# Patient Record
Sex: MALE | Race: OTHER | NOT HISPANIC OR LATINO | Employment: FULL TIME | URBAN - METROPOLITAN AREA
[De-identification: names, ages, dates, MRNs, and addresses within clinical notes are randomized per-mention and may not be internally consistent; named-entity substitution may affect disease eponyms.]

---

## 2020-07-10 ENCOUNTER — HOSPITAL ENCOUNTER (EMERGENCY)
Facility: MEDICAL CENTER | Age: 60
End: 2020-07-10
Attending: EMERGENCY MEDICINE
Payer: COMMERCIAL

## 2020-07-10 VITALS
HEART RATE: 70 BPM | TEMPERATURE: 98.3 F | DIASTOLIC BLOOD PRESSURE: 88 MMHG | WEIGHT: 149.03 LBS | OXYGEN SATURATION: 98 % | SYSTOLIC BLOOD PRESSURE: 139 MMHG | HEIGHT: 65 IN | BODY MASS INDEX: 24.83 KG/M2 | RESPIRATION RATE: 18 BRPM

## 2020-07-10 DIAGNOSIS — R52 BODY ACHES: ICD-10-CM

## 2020-07-10 DIAGNOSIS — R68.89 FLU-LIKE SYMPTOMS: ICD-10-CM

## 2020-07-10 DIAGNOSIS — Z20.822 SUSPECTED COVID-19 VIRUS INFECTION: ICD-10-CM

## 2020-07-10 LAB
COVID ORDER STATUS COVID19: NORMAL
SARS-COV-2 RNA RESP QL NAA+PROBE: NOTDETECTED
SPECIMEN SOURCE: NORMAL

## 2020-07-10 PROCEDURE — U0003 INFECTIOUS AGENT DETECTION BY NUCLEIC ACID (DNA OR RNA); SEVERE ACUTE RESPIRATORY SYNDROME CORONAVIRUS 2 (SARS-COV-2) (CORONAVIRUS DISEASE [COVID-19]), AMPLIFIED PROBE TECHNIQUE, MAKING USE OF HIGH THROUGHPUT TECHNOLOGIES AS DESCRIBED BY CMS-2020-01-R: HCPCS

## 2020-07-10 PROCEDURE — 99283 EMERGENCY DEPT VISIT LOW MDM: CPT

## 2020-07-10 PROCEDURE — C9803 HOPD COVID-19 SPEC COLLECT: HCPCS | Performed by: EMERGENCY MEDICINE

## 2020-07-10 NOTE — ED PROVIDER NOTES
ED Provider Note    CHIEF COMPLAINT  Chief Complaint   Patient presents with   • Flu Like Symptoms     since yesterday, c/o dizziness, muscle aches, fever, headache       HPI  Dimapepe Leung is a 59 y.o. male who presents to the emergency department with a chief complaint of fevers, chills, body aches, flulike symptoms, joint aches.  Generally not feeling well.  Symptoms started yesterday.  Symptoms occur within the setting of the current COVID-19 pandemic.  Patient is concerned for possible infection.  He denies any shortness of breath.  No chest pain.  No leg pain.    REVIEW OF SYSTEMS  See HPI for further details. All other systems are negative.     PAST MEDICAL HISTORY  History reviewed. No pertinent past medical history.    FAMILY HISTORY  History reviewed. No pertinent family history.    SOCIAL HISTORY  Social History     Socioeconomic History   • Marital status:      Spouse name: Not on file   • Number of children: Not on file   • Years of education: Not on file   • Highest education level: Not on file   Occupational History   • Not on file   Social Needs   • Financial resource strain: Not on file   • Food insecurity     Worry: Not on file     Inability: Not on file   • Transportation needs     Medical: Not on file     Non-medical: Not on file   Tobacco Use   • Smoking status: Never Smoker   • Smokeless tobacco: Never Used   Substance and Sexual Activity   • Alcohol use: Not Currently   • Drug use: Never   • Sexual activity: Not on file   Lifestyle   • Physical activity     Days per week: Not on file     Minutes per session: Not on file   • Stress: Not on file   Relationships   • Social connections     Talks on phone: Not on file     Gets together: Not on file     Attends Jehovah's witness service: Not on file     Active member of club or organization: Not on file     Attends meetings of clubs or organizations: Not on file     Relationship status: Not on file   • Intimate partner violence     Fear of current  "or ex partner: Not on file     Emotionally abused: Not on file     Physically abused: Not on file     Forced sexual activity: Not on file   Other Topics Concern   • Not on file   Social History Narrative   • Not on file       SURGICAL HISTORY  History reviewed. No pertinent surgical history.    CURRENT MEDICATIONS  Home Medications     Reviewed by Merrill Ruff (Pharmacy Tech) on 07/10/20 at 0956  Med List Status: Complete   Medication Last Dose Status        Patient Cecil Taking any Medications                       ALLERGIES  No Known Allergies    PHYSICAL EXAM  VITAL SIGNS: /99   Pulse 74   Temp 36.8 °C (98.3 °F) (Temporal)   Resp 16   Ht 1.651 m (5' 5\")   Wt 67.6 kg (149 lb 0.5 oz)   SpO2 97%   BMI 24.80 kg/m²    Constitutional: Well developed, Well nourished, No acute distress, Non-toxic appearance.   HENT: Normocephalic, Atraumatic, Bilateral external ears normal, Oropharynx moist, No oral exudates, Nose normal.   Eyes: PERRLA, EOMI, Conjunctiva normal, No discharge.   Neck: Normal range of motion, No tenderness, Supple, No stridor.   Cardiovascular: Regular rate and rhythm, no audible murmur.  Thorax & Lungs: There to auscultation bilaterally.  No rales, rhonchi, or wheezing.  Abdomen: Bowel sounds normal, Soft, No tenderness, No masses, No pulsatile masses.   Skin: Warm, Dry, No erythema, No rash.   Back: No tenderness, No CVA tenderness.   Extremities: Intact distal pulses, No tenderness, No cyanosis, No clubbing.  Edema.  Neurologic: Alert & oriented x 3, Normal motor function, Normal sensory function, No focal deficits noted.     EKG      RADIOLOGY/PROCEDURES  Results for orders placed or performed during the hospital encounter of 07/10/20   COVID/SARS CoV-2 PCR    Specimen: Nasopharyngeal; Respirate   Result Value Ref Range    COVID Order Status Discharge-Recvd    SARS-CoV-2, PCR (In-House)   Result Value Ref Range    SARS-CoV-2 Source NP Swab          COURSE & MEDICAL DECISION " MAKING  Pertinent Labs & Imaging studies reviewed. (See chart for details)    The patient presents today with flulike symptoms.  Symptoms may very well be consistent with COVID-19.  Patient is swab.  Test will be returned in 24 hours.  Return precautions including shortness of breath and chest pain discussed.  Patient verbalized understanding.  At this time his pulse oximetry and work of breathing are normal.  Encourage the use of Tylenol for pain control.  Increase fluids.  Patient was discharged home in stable condition.  Instructed to self isolate pending test result.    The patient will return for new or worsening symptoms and is stable at the time of discharge.    The patient is referred to a primary physician for blood pressure management, diabetic screening, and for all other preventative health concerns.    DISPOSITION:  Patient will be discharged home in stable condition.    FOLLOW UP:  Kath Schulz M.D.  [facility] [address] [place] [address]  [place] [address]  [phone]    Schedule an appointment as soon as possible for a visit       [facility], Emergency Dept  [address]  [facility] [address] [phone]    If symptoms worsen      OUTPATIENT MEDICATIONS:  New Prescriptions    No medications on file     FINAL IMPRESSION  1. Body aches    2. Flu-like symptoms    3. Suspected COVID-19 virus infection              Electronically signed by: Juan Francisco Harris M.D., 7/10/2020 10:11 AM

## 2020-07-10 NOTE — ED TRIAGE NOTES
"Chief Complaint   Patient presents with   • Flu Like Symptoms     since yesterday, c/o dizziness, muscle aches, fever, headache     /99   Pulse 74   Temp 36.8 °C (98.3 °F) (Temporal)   Resp 16   Ht 1.651 m (5' 5\")   Wt 67.6 kg (149 lb 0.5 oz)   SpO2 97%   BMI 24.80 kg/m²     Pt arrived w/ above concern, concerned for possible exposure to COVID 19    Mask in place on pt   "

## 2020-07-10 NOTE — LETTER
7/11/2020               Dima Leung  [address]  [place] [address]        Dear Dima (MR#[id number]),    This letter is to inform you that your COVID-19 test result is NEGATIVE.  This means that the virus that causes COVID-19 was not found in your sample.      A review of your test during your recent visit requires that we notify you of the following:    Your nasal swab was negative for the novel coronavirus (COVID-19).     You are encouraged to stay at home until you have had no fever for 72 hours without the use of fever reducing medications and your symptoms are improving. There is no need for further self-quarantine for 14 days for COVID-19 unless otherwise directed by the Health Department.     For any further questions regarding COVID-19, please contact the [facility] at [phone].  Thank you for your cooperation in the matter.      Sincerely,      [facility] Health Care Team

## 2020-07-10 NOTE — ED NOTES
Covid swab done.  D/c pt home, rx given . Pt aware of f/u instructions and covid precautions , aware to return for any changes or concerns. No further questions upon d/c home from ed

## 2020-07-11 NOTE — ED NOTES
COVID-19 Test Follow Up  07/11/20         7/10/2020 09:51   SARS-CoV-2 by PCR NotDetected       Patient tested negative for COVID-19. I have informed the patient of the result by phone and letter. Encouraged to stay at home until no fever for 72 hours without the use of fever reducing medications and symptoms improving. Pt reports roommate is COVID positive, and inquired about returning to work.  Pt was encouraged to call the health department at [phone] for further guidance.      Nathalia Cote, PharmD, BCPPS

## 2020-07-25 ENCOUNTER — HOSPITAL ENCOUNTER (EMERGENCY)
Facility: MEDICAL CENTER | Age: 60
End: 2020-07-25
Payer: COMMERCIAL

## 2020-07-25 PROCEDURE — 302449 STATCHG TRIAGE ONLY (STATISTIC)

## 2020-07-25 NOTE — ED NOTES
This RN was informed by registration personnel, that Pt had left the department prior to physically being roomed. This RN never had the opportunity to assess the patient in any way.

## 2021-02-16 ENCOUNTER — TELEPHONE (OUTPATIENT)
Dept: MEDICAL GROUP | Facility: PHYSICIAN GROUP | Age: 61
End: 2021-02-16

## 2021-02-16 NOTE — TELEPHONE ENCOUNTER
Left message for patient to call back regarding pre-visit planning. Please transfer call to [phone].

## 2021-02-19 NOTE — TELEPHONE ENCOUNTER
Left message for patient to call back regarding pre-visit planning. Please transfer call to [phone].

## 2021-02-23 ENCOUNTER — OFFICE VISIT (OUTPATIENT)
Dept: MEDICAL GROUP | Facility: PHYSICIAN GROUP | Age: 61
End: 2021-02-23
Payer: COMMERCIAL

## 2021-02-23 VITALS
HEIGHT: 60 IN | TEMPERATURE: 97.9 F | SYSTOLIC BLOOD PRESSURE: 150 MMHG | DIASTOLIC BLOOD PRESSURE: 90 MMHG | WEIGHT: 146.83 LBS | BODY MASS INDEX: 28.83 KG/M2 | OXYGEN SATURATION: 98 % | HEART RATE: 67 BPM

## 2021-02-23 DIAGNOSIS — Z23 NEED FOR TDAP VACCINATION: ICD-10-CM

## 2021-02-23 DIAGNOSIS — R03.0 ELEVATED BLOOD PRESSURE READING: ICD-10-CM

## 2021-02-23 DIAGNOSIS — M54.9 UPPER BACK PAIN: ICD-10-CM

## 2021-02-23 DIAGNOSIS — E78.5 DYSLIPIDEMIA: ICD-10-CM

## 2021-02-23 PROCEDURE — 90471 IMMUNIZATION ADMIN: CPT | Performed by: FAMILY MEDICINE

## 2021-02-23 PROCEDURE — 90715 TDAP VACCINE 7 YRS/> IM: CPT | Performed by: FAMILY MEDICINE

## 2021-02-23 PROCEDURE — 99204 OFFICE O/P NEW MOD 45 MIN: CPT | Mod: 25 | Performed by: FAMILY MEDICINE

## 2021-02-23 RX ORDER — ROSUVASTATIN CALCIUM 5 MG/1
5 TABLET, COATED ORAL EVERY EVENING
COMMUNITY
End: 2021-03-23 | Stop reason: SDUPTHER

## 2021-02-23 RX ORDER — MELOXICAM 7.5 MG/1
7.5 TABLET ORAL DAILY
COMMUNITY

## 2021-02-23 ASSESSMENT — PATIENT HEALTH QUESTIONNAIRE - PHQ9: CLINICAL INTERPRETATION OF PHQ2 SCORE: 0

## 2021-02-23 NOTE — LETTER
[facility]  Nicki Grijalva M.D.  [address]  [place] [address]  Fax: [phone]   Authorization for Release/Disclosure of   Protected Health Information   Name: JOSÉ MANUEL VEE : 1960 SSN: [id number]   Address: [address] [place] [address] [address]  [place] [address] Phone:    [phone] (home)    I authorize the entity listed below to release/disclose the PHI below to:   [facility]/Nicki Grijalva M.D. and Nicki Grijalva M.D.   Provider or Entity Name:     Address   City, State, Zip   Phone:      Fax:     Reason for request: continuity of care   Information to be released:    [  ] LAST COLONOSCOPY,  including any PATH REPORT and follow-up  [  ] LAST FIT/COLOGUARD RESULT [  ] LAST DEXA  [  ] LAST MAMMOGRAM  [  ] LAST PAP  [  ] LAST LABS [  ] RETINA EXAM REPORT  [  ] IMMUNIZATION RECORDS  [  ] Release all info      [  ] Check here and initial the line next to each item to release ALL health information INCLUDING  _____ Care and treatment for drug and / or alcohol abuse  _____ HIV testing, infection status, or AIDS  _____ Genetic Testing    DATES OF SERVICE OR TIME PERIOD TO BE DISCLOSED: _____________  I understand and acknowledge that:  * This Authorization may be revoked at any time by you in writing, except if your health information has already been used or disclosed.  * Your health information that will be used or disclosed as a result of you signing this authorization could be re-disclosed by the recipient. If this occurs, your re-disclosed health information may no longer be protected by State or Federal laws.  * You may refuse to sign this Authorization. Your refusal will not affect your ability to obtain treatment.  * This Authorization becomes effective upon signing and will  on (date) __________.      If no date is indicated, this Authorization will  one (1) year from the signature date.    Name: José Manuel Vee    Signature:   Date:     2021       PLEASE FAX REQUESTED RECORDS BACK  TO: [phone]

## 2021-02-23 NOTE — LETTER
[facility]  Nicki Grijalva M.D.  [address]  [place] [address]  Fax: [phone]   Authorization for Release/Disclosure of   Protected Health Information   Name: JOSÉ MANUEL VEE : 1960 SSN: [id number]   Address: [address] [place] [address] [address]  [place] [address] Phone:    [phone] (home)    I authorize the entity listed below to release/disclose the PHI below to:   [facility]/Nicki Grijalva M.D. and Nicki Grijalva M.D.   Provider or Entity Name:    [facility]    Address   City, State, [facility]   Phone:      Fax:     Reason for request: continuity of care   Information to be released:    [  ] LAST COLONOSCOPY,  including any PATH REPORT and follow-up  [  ] LAST FIT/COLOGUARD RESULT [  ] LAST DEXA  [  ] LAST MAMMOGRAM  [  ] LAST PAP  [  ] LAST LABS [  ] RETINA EXAM REPORT  [  ] IMMUNIZATION RECORDS  [ x ] Release all info      [  ] Check here and initial the line next to each item to release ALL health information INCLUDING  _____ Care and treatment for drug and / or alcohol abuse  _____ HIV testing, infection status, or AIDS  _____ Genetic Testing    DATES OF SERVICE OR TIME PERIOD TO BE DISCLOSED: _____________  I understand and acknowledge that:  * This Authorization may be revoked at any time by you in writing, except if your health information has already been used or disclosed.  * Your health information that will be used or disclosed as a result of you signing this authorization could be re-disclosed by the recipient. If this occurs, your re-disclosed health information may no longer be protected by State or Federal laws.  * You may refuse to sign this Authorization. Your refusal will not affect your ability to obtain treatment.  * This Authorization becomes effective upon signing and will  on (date) __________.      If no date is indicated, this Authorization will  one (1) year from the signature date.    Name: José Manuel Vee    Signature:   Date:     2021       PLEASE FAX  REQUESTED RECORDS BACK TO: [phone]

## 2021-02-23 NOTE — LETTER
[facility]  Nicki Grijalva M.D.  [address]  [place] [address]  Fax: [phone]   Authorization for Release/Disclosure of   Protected Health Information   Name: JOSÉ MANUEL VEE : 1960 SSN: [id number]   Address: [address] [place] [address] [address]  [place] [address] Phone:    [phone] (home)    I authorize the entity listed below to release/disclose the PHI below to:   [facility]/Nicki Grijalva M.D. and Nicki Grijalva M.D.   Provider or Entity Name:    [facility]   Address   City, State, Zip   Phone:      Fax:     Reason for request: continuity of care   Information to be released:    [ x ] LAST COLONOSCOPY,  including any PATH REPORT and follow-up  [  ] LAST FIT/COLOGUARD RESULT [  ] LAST DEXA  [  ] LAST MAMMOGRAM  [  ] LAST PAP  [  ] LAST LABS [  ] RETINA EXAM REPORT  [  ] IMMUNIZATION RECORDS  [  ] Release all info      [  ] Check here and initial the line next to each item to release ALL health information INCLUDING  _____ Care and treatment for drug and / or alcohol abuse  _____ HIV testing, infection status, or AIDS  _____ Genetic Testing    DATES OF SERVICE OR TIME PERIOD TO BE DISCLOSED: _____________  I understand and acknowledge that:  * This Authorization may be revoked at any time by you in writing, except if your health information has already been used or disclosed.  * Your health information that will be used or disclosed as a result of you signing this authorization could be re-disclosed by the recipient. If this occurs, your re-disclosed health information may no longer be protected by State or Federal laws.  * You may refuse to sign this Authorization. Your refusal will not affect your ability to obtain treatment.  * This Authorization becomes effective upon signing and will  on (date) __________.      If no date is indicated, this Authorization will  one (1) year from the signature date.    Name: José Manuel Vee    Signature:   Date:     2021       PLEASE FAX REQUESTED  RECORDS BACK TO: [phone]

## 2021-02-23 NOTE — PROGRESS NOTES
Subjective:      Dima Leung is a 60 y.o. male who presents with New Patient ([facility])            HPI     This is a 60-year-old [place] male who is here as a new patient to get established with me. He was previously under the care of [facility]. He said his PCP Maritza Brower joined a private group.    He has dyslipidemia for which he takes Crestor regularly without side effects. He said his last blood work was in January of this year. He couldn't tell me if his numbers were all very good.    He said he was treated for upper back pain initially with ibuprofen 800 mg which she thought was too strong and this was switched to meloxicam 7.5 mg daily. He said he went to have massage done which has worked. His pain is now resolved. He said he had an x-ray done by his PCP and he stated that there were no evidence of arthritis. No history of trauma but he does heavy lifting at work. He thought he may have strained his muscles in the upper back.    He has no history of hypertension but today's blood pressure is elevated. He said his blood pressure runs a little bit high when when he goes to his doctor's visit around one thirties over nineties. Denies any headache, dizziness, lightheadedness, chest pain, palpitations, shortness of breath, leg edema.      I reviewed the following    Past Medical History:   Diagnosis Date   • Dyslipidemia 2/23/2021   • Upper back pain 2/23/2021        History reviewed. No pertinent surgical history.    No Known Allergies    Current Outpatient Medications   Medication Sig Dispense Refill   • rosuvastatin (CRESTOR) 5 MG Tab Take 5 mg by mouth every evening.     • meloxicam (MOBIC) 7.5 MG Tab Take 7.5 mg by mouth every day.       No current facility-administered medications for this visit.        Family History   Problem Relation Age of Onset   • Kidney stones Mother    • Hypertension Mother    • Hypertension Father    • Hypertension Brother    • Hyperlipidemia  Brother    • Diabetes Brother    • Hypertension Brother    • Cancer Brother         throat       Social History     Socioeconomic History   • Marital status:      Spouse name: Not on file   • Number of children: 1   • Years of education: Not on file   • Highest education level: Not on file   Occupational History   • Occupation: shipping dept.   Tobacco Use   • Smoking status: Never Smoker   • Smokeless tobacco: Never Used   Substance and Sexual Activity   • Alcohol use: Never   • Drug use: Never   • Sexual activity: Not Currently     Partners: Female   Other Topics Concern   • Not on file   Social History Narrative   • Not on file     Social Determinants of Health     Financial Resource Strain:    • Difficulty of Paying Living Expenses:    Food Insecurity:    • Worried About Running Out of Food in the Last Year:    • Ran Out of Food in the Last Year:    Transportation Needs:    • Lack of Transportation (Medical):    • Lack of Transportation (Non-Medical):    Physical Activity:    • Days of Exercise per Week:    • Minutes of Exercise per Session:    Stress:    • Feeling of Stress :    Social Connections:    • Frequency of Communication with Friends and Family:    • Frequency of Social Gatherings with Friends and Family:    • Attends Jain Services:    • Active Member of Clubs or Organizations:    • Attends Club or Organization Meetings:    • Marital Status:    Intimate Partner Violence:    • Fear of Current or Ex-Partner:    • Emotionally Abused:    • Physically Abused:    • Sexually Abused:         ROS     As per HPI, the rest are negative.     Objective:     /90   Pulse 67   Temp 36.6 °C (97.9 °F) (Temporal)   Ht 1.524 m (5')   Wt 66.6 kg (146 lb 13.2 oz)   SpO2 98%   BMI 28.68 kg/m²      Physical Exam  Vitals and nursing note reviewed.   Constitutional:       General: He is not in acute distress.     Appearance: He is well-developed. He is not diaphoretic.   HENT:      Head: Normocephalic  and atraumatic.      Right Ear: External ear normal.      Left Ear: External ear normal.      Ears:      Comments: Excessive cerumen both ear canals, tympanic membranes not visible     Nose: Nose normal.      Mouth/Throat:      Pharynx: No oropharyngeal exudate.   Eyes:      General: No scleral icterus.        Right eye: No discharge.         Left eye: No discharge.      Conjunctiva/sclera: Conjunctivae normal.      Pupils: Pupils are equal, round, and reactive to light.   Neck:      Thyroid: No thyromegaly.      Vascular: No JVD.      Trachea: No tracheal deviation.   Cardiovascular:      Rate and Rhythm: Normal rate and regular rhythm.      Heart sounds: Normal heart sounds. No murmur. No friction rub. No gallop.    Pulmonary:      Effort: Pulmonary effort is normal. No respiratory distress.      Breath sounds: Normal breath sounds. No stridor. No wheezing or rales.   Chest:      Chest wall: No tenderness.   Abdominal:      General: Bowel sounds are normal. There is no distension.      Palpations: Abdomen is soft. There is no mass.      Tenderness: There is no abdominal tenderness. There is no guarding or rebound.      Hernia: No hernia is present.   Musculoskeletal:         General: No tenderness or deformity. Normal range of motion.      Cervical back: Normal range of motion and neck supple.      Comments: No pinpoint tenderness spinous processes of the thoracic spine, no spasms or tenderness of the paraspinal muscles thoracic region, no skin changes   Lymphadenopathy:      Cervical: No cervical adenopathy.   Skin:     General: Skin is warm and dry.      Coloration: Skin is not pale.      Findings: No erythema or rash.   Neurological:      Mental Status: He is alert and oriented to person, place, and time.      Cranial Nerves: No cranial nerve deficit.      Motor: No abnormal muscle tone.      Coordination: Coordination normal.      Deep Tendon Reflexes: Reflexes are normal and symmetric. Reflexes normal.    Psychiatric:         Behavior: Behavior normal.         Thought Content: Thought content normal.         Judgment: Judgment normal.                      Assessment/Plan:        1. Dyslipidemia  Continue Crestor 5 mg 1 tablet daily. I will get records from previous physician including the most recent lab work.    2. Upper back pain  Most likely muscle strain from heavy lifting. At this time he does not have any more pain. This was treated conservatively with meloxicam and massage. I told him to keep the meloxicam on hand and he can take it as needed when he has recurrence of the pain.    3. Elevated blood pressure reading  He will start monitoring his blood pressure daily. Explained to him the proper way to check blood pressure at home. Advised to avoid salty foods and not to add salt to his food. Advise regular exercises. I will have him return for reevaluation in a month. Consider medication if the blood pressure is persistently elevated. Made aware of potential risks of untreated hypertension including stroke, endorgan damage.    4. Need for Tdap vaccination  Patient needs a Tdap and this was given to him today.  - Tdap Vaccine =>8YO IM    He said he already had a colonoscopy in 2013 and we will get the records.    Follow-up in a month.      Please note that this dictation was created using voice recognition software. I have worked with consultants from the vendor as well as technical experts from [employer] to optimize the interface. I have made every reasonable attempt to correct obvious errors, but I expect that there are errors of grammar and possibly content I did not discover before finalizing the note.

## 2021-03-23 ENCOUNTER — OFFICE VISIT (OUTPATIENT)
Dept: MEDICAL GROUP | Facility: PHYSICIAN GROUP | Age: 61
End: 2021-03-23
Payer: COMMERCIAL

## 2021-03-23 VITALS
DIASTOLIC BLOOD PRESSURE: 84 MMHG | TEMPERATURE: 98 F | BODY MASS INDEX: 29 KG/M2 | HEART RATE: 60 BPM | WEIGHT: 147.71 LBS | HEIGHT: 60 IN | OXYGEN SATURATION: 97 % | SYSTOLIC BLOOD PRESSURE: 140 MMHG

## 2021-03-23 DIAGNOSIS — R03.0 ELEVATED BLOOD PRESSURE READING: ICD-10-CM

## 2021-03-23 DIAGNOSIS — Z11.59 NEED FOR HEPATITIS C SCREENING TEST: ICD-10-CM

## 2021-03-23 DIAGNOSIS — E78.5 DYSLIPIDEMIA: ICD-10-CM

## 2021-03-23 PROCEDURE — 99214 OFFICE O/P EST MOD 30 MIN: CPT | Performed by: FAMILY MEDICINE

## 2021-03-23 RX ORDER — ROSUVASTATIN CALCIUM 5 MG/1
5 TABLET, COATED ORAL EVERY EVENING
Qty: 90 TABLET | Refills: 1 | Status: SHIPPED | OUTPATIENT
Start: 2021-03-23 | End: 2021-09-30

## 2021-03-23 NOTE — PROGRESS NOTES
Subjective:      Dima Leung is a 60 y.o. male who presents with Hypertension            HPI     Patient returns for follow-up.    I saw him as a new patient a month ago and the blood pressure was elevated at 150/90.  No prior history of hypertension and no prior treatment for this.  He has been monitoring his blood pressure at home and he forgot to bring his blood pressure log but he said week after I saw him the blood pressure was running in the 145-150/88-90 range and then after that it started to come down and has been running from 138-140/86-88.    He continues to take his Crestor for dyslipidemia 5 mg 1 tablet daily.  He said there are times when he doubles up the dose because he thinks that the dose is not sufficient.  I got his old records and the last cholesterol panel was in January 2021 that came back all at target.  Denies any side effects from the Crestor.    He brought colonoscopy report which was done in April 2013 that showed internal hemorrhoids grade 2 otherwise normal with recommendation to have another one in 10 years.    Past medical history, past surgical history, family history reviewed-no changes    Social history reviewed-no changes    Allergies reviewed-no changes    Medications reviewed-no changes    ROS     Per HPI, the rest are negative.       Objective:     /84   Pulse 60   Temp 36.7 °C (98 °F) (Temporal)   Ht 1.524 m (5')   Wt 67 kg (147 lb 11.3 oz)   SpO2 97%   BMI 28.85 kg/m²      Physical Exam     Examined alert, awake, oriented, not in distress    Neck-supple, no lymphadenopathy, no thyromegaly  Lungs-clear to auscultation, no rales, no wheezes  Heart-regular rate and rhythm, no murmur  Extremities-no edema, clubbing, cyanosis       I reviewed his old records.     Assessment/Plan:        1. Elevated blood pressure reading  Blood pressure is better but I told him we need to get this down consistently below 140 systolic and below 85 diastolic.  Patient will work on  watching the salt, exercise and weight loss.  We will wait 3 months and see how his blood pressure runs.  He will bring his log and his machine next visit.  We will do updated blood work for the next visit.  - Lipid Profile; Future  - Comp Metabolic Panel; Future  - HEP C VIRUS ANTIBODY; Future    2. Dyslipidemia  I advised the patient to stay on Crestor 5 mg 1 tablet daily regularly and not to double up on the dose since the last lipid panel in January 2021 came back all at goal.  We will increase the dose if needed after we get the next blood work in 3 months.  - rosuvastatin (CRESTOR) 5 MG Tab; Take 1 tablet by mouth every evening.  Dispense: 90 tablet; Refill: 1  - Lipid Profile; Future  - Comp Metabolic Panel; Future  - HEP C VIRUS ANTIBODY; Future    3. Need for hepatitis C screening test  Patient qualifies for hepatitis C screening based on CDC guidelines and this was ordered.  - Lipid Profile; Future  - Comp Metabolic Panel; Future  - HEP C VIRUS ANTIBODY; Future

## 2021-06-12 LAB
ALBUMIN SERPL-MCNC: 4.5 G/DL (ref 3.8–4.9)
ALBUMIN/GLOB SERPL: 1.4 {RATIO} (ref 1.2–2.2)
ALP SERPL-CCNC: 106 IU/L (ref 48–121)
ALT SERPL-CCNC: 59 IU/L (ref 0–44)
AST SERPL-CCNC: 32 IU/L (ref 0–40)
BILIRUB SERPL-MCNC: 0.6 MG/DL (ref 0–1.2)
BUN SERPL-MCNC: 12 MG/DL (ref 6–24)
BUN/CREAT SERPL: 15 (ref 9–20)
CALCIUM SERPL-MCNC: 9.3 MG/DL (ref 8.7–10.2)
CHLORIDE SERPL-SCNC: 105 MMOL/L (ref 96–106)
CHOLEST SERPL-MCNC: 166 MG/DL (ref 100–199)
CO2 SERPL-SCNC: 24 MMOL/L (ref 20–29)
CREAT SERPL-MCNC: 0.8 MG/DL (ref 0.76–1.27)
GLOBULIN SER CALC-MCNC: 3.3 G/DL (ref 1.5–4.5)
GLUCOSE SERPL-MCNC: 96 MG/DL (ref 65–99)
HCV AB S/CO SERPL IA: <0.1 S/CO RATIO (ref 0–0.9)
HDLC SERPL-MCNC: 58 MG/DL
LABORATORY COMMENT REPORT: NORMAL
LDLC SERPL CALC-MCNC: 88 MG/DL (ref 0–99)
POTASSIUM SERPL-SCNC: 4.4 MMOL/L (ref 3.5–5.2)
PROT SERPL-MCNC: 7.8 G/DL (ref 6–8.5)
SODIUM SERPL-SCNC: 142 MMOL/L (ref 134–144)
TRIGL SERPL-MCNC: 112 MG/DL (ref 0–149)
VLDLC SERPL CALC-MCNC: 20 MG/DL (ref 5–40)

## 2021-06-22 ENCOUNTER — OFFICE VISIT (OUTPATIENT)
Dept: MEDICAL GROUP | Facility: PHYSICIAN GROUP | Age: 61
End: 2021-06-22
Payer: COMMERCIAL

## 2021-06-22 VITALS
WEIGHT: 146.39 LBS | HEART RATE: 57 BPM | OXYGEN SATURATION: 98 % | SYSTOLIC BLOOD PRESSURE: 128 MMHG | HEIGHT: 60 IN | BODY MASS INDEX: 28.74 KG/M2 | TEMPERATURE: 97.7 F | DIASTOLIC BLOOD PRESSURE: 80 MMHG

## 2021-06-22 DIAGNOSIS — E78.5 DYSLIPIDEMIA: ICD-10-CM

## 2021-06-22 DIAGNOSIS — R03.0 ELEVATED BLOOD PRESSURE READING: ICD-10-CM

## 2021-06-22 DIAGNOSIS — Z23 NEED FOR SHINGLES VACCINE: ICD-10-CM

## 2021-06-22 DIAGNOSIS — R74.01 ELEVATED ALANINE AMINOTRANSFERASE (ALT) LEVEL: ICD-10-CM

## 2021-06-22 PROCEDURE — 99214 OFFICE O/P EST MOD 30 MIN: CPT | Mod: 25 | Performed by: FAMILY MEDICINE

## 2021-06-22 PROCEDURE — 90750 HZV VACC RECOMBINANT IM: CPT | Performed by: FAMILY MEDICINE

## 2021-06-22 PROCEDURE — 90471 IMMUNIZATION ADMIN: CPT | Performed by: FAMILY MEDICINE

## 2021-06-22 NOTE — PROGRESS NOTES
Subjective:      Dima Leung is a 60 y.o. male who presents with Hypertension            HPI     Patient returns for follow-up.    We have been following him for elevated blood pressure reading with no prior history of hypertension.  He has been managing this with his own efforts.  He has lost 8 pounds since last visit.  Today the blood pressure is down to normal level.    He continues to take rosuvastatin for dyslipidemia without side effects.  Blood work was done before this visit.    He is due for Shingrix vaccine.    Past medical history, past surgical history, family history reviewed-no changes    Social history reviewed-no changes    Allergies reviewed-no changes    Medications reviewed-no changes    ROS     As per HPI, the rest are negative.     Objective:     /80   Pulse (!) 57   Temp 36.5 °C (97.7 °F) (Temporal)   Ht 1.524 m (5')   Wt 66.4 kg (146 lb 6.2 oz)   SpO2 98%   BMI 28.59 kg/m²      Physical Exam     Examined alert, awake, oriented, not in distress    Neck-supple, no lymphadenopathy, no thyromegaly  Lungs-clear to auscultation, no rales, no wheezes  Heart-regular rate and rhythm, no murmur  Extremities-no edema, clubbing, cyanosis          Orders Only on 06/11/2021   Component Date Value Ref Range Status   • Glucose 06/11/2021 96  65 - 99 mg/dL Final   • Bun 06/11/2021 12  6 - 24 mg/dL Final   • Creatinine 06/11/2021 0.80  0.76 - 1.27 mg/dL Final   • GFR If Non  06/11/2021 98  >59 mL/min/1.73 Final   • GFR If  06/11/2021 113  >59 mL/min/1.73 Final    Comment: **[facility] currently reports eGFR in compliance with the current**  recommendations of the National Kidney Foundation. [facility] will  update reporting as new guidelines are published from the NKF-ASN  Task force.     • Bun-Creatinine Ratio 06/11/2021 15  9 - 20 Final   • Sodium 06/11/2021 142  134 - 144 mmol/L Final   • Potassium 06/11/2021 4.4  3.5 - 5.2 mmol/L Final   • Chloride 06/11/2021 105  96  - 106 mmol/L Final   • Co2 06/11/2021 24  20 - 29 mmol/L Final   • Calcium 06/11/2021 9.3  8.7 - 10.2 mg/dL Final   • Total Protein 06/11/2021 7.8  6.0 - 8.5 g/dL Final   • Albumin 06/11/2021 4.5  3.8 - 4.9 g/dL Final   • Globulin 06/11/2021 3.3  1.5 - 4.5 g/dL Final   • A-G Ratio 06/11/2021 1.4  1.2 - 2.2 Final   • Total Bilirubin 06/11/2021 0.6  0.0 - 1.2 mg/dL Final   • Alkaline Phosphatase 06/11/2021 106  48 - 121 IU/L Final   • AST(SGOT) 06/11/2021 32  0 - 40 IU/L Final   • ALT(SGPT) 06/11/2021 59* 0 - 44 IU/L Final   • Cholesterol,Tot 06/11/2021 166  100 - 199 mg/dL Final   • Triglycerides 06/11/2021 112  0 - 149 mg/dL Final   • HDL 06/11/2021 58  >39 mg/dL Final   • VLDL Cholesterol Calc 06/11/2021 20  5 - 40 mg/dL Final   • LDL Chol Calc (NIH) 06/11/2021 88  0 - 99 mg/dL Final   • Comment: 06/11/2021 CANCELED   Final    Result canceled by the ancillary.   • Hepatitis C Antibody 06/11/2021 <0.1  0.0 - 0.9 s/co ratio Final    Comment: Negative:     < 0.8  Indeterminate: 0.8 - 0.9  Positive:     > 0.9  The CDC recommends that a positive HCV antibody result  be followed up with a HCV Nucleic Acid Amplification  test (871422).                Assessment/Plan:        1. Elevated blood pressure reading  Blood pressure is now down to goal.  We do not need to put him on blood pressure medication.  He will continue to manage this with his own efforts including watching the salt intake, regular exercises and weight loss.    2. Dyslipidemia  This is all at target.  Continue cholesterol medication.  - Lipid Profile; Future  - Comp Metabolic Panel; Future    3. Elevated alanine aminotransferase (ALT) level  Slight elevation of ALT.  No prior elevation of ALT per review of old records including records from [facility].  He only drinks wine occasionally.  Advised to avoid alcohol completely.  His hepatitis C test came back negative.  Recheck blood work in 4 months.  - Lipid Profile; Future  - Comp Metabolic Panel;  Future    4. Need for shingles vaccine  First dose of Shingrix given.  Discussed potential side effects and how he can manage them.  We will give the second dose next visit.  - Shingles Vaccine (Shingrix)    Follow-up in 4 months.      Please note that this dictation was created using voice recognition software. I have worked with consultants from the vendor as well as technical experts from [facility] to optimize the interface. I have made every reasonable attempt to correct obvious errors, but I expect that there are errors of grammar and possibly content I did not discover before finalizing the note.

## 2021-09-30 DIAGNOSIS — E78.5 DYSLIPIDEMIA: ICD-10-CM

## 2021-09-30 RX ORDER — ROSUVASTATIN CALCIUM 5 MG/1
5 TABLET, COATED ORAL EVERY EVENING
Qty: 90 TABLET | Refills: 1 | Status: SHIPPED | OUTPATIENT
Start: 2021-09-30 | End: 2022-02-15 | Stop reason: SDUPTHER

## 2021-10-12 LAB
ALBUMIN SERPL-MCNC: 4.4 G/DL (ref 3.8–4.9)
ALBUMIN/GLOB SERPL: 1.5 {RATIO} (ref 1.2–2.2)
ALP SERPL-CCNC: 122 IU/L (ref 44–121)
ALT SERPL-CCNC: 57 IU/L (ref 0–44)
AST SERPL-CCNC: 35 IU/L (ref 0–40)
BILIRUB SERPL-MCNC: 0.6 MG/DL (ref 0–1.2)
BUN SERPL-MCNC: 11 MG/DL (ref 8–27)
BUN/CREAT SERPL: 12 (ref 10–24)
CALCIUM SERPL-MCNC: 9 MG/DL (ref 8.6–10.2)
CHLORIDE SERPL-SCNC: 106 MMOL/L (ref 96–106)
CHOLEST SERPL-MCNC: 168 MG/DL (ref 100–199)
CO2 SERPL-SCNC: 23 MMOL/L (ref 20–29)
CREAT SERPL-MCNC: 0.94 MG/DL (ref 0.76–1.27)
GLOBULIN SER CALC-MCNC: 2.9 G/DL (ref 1.5–4.5)
GLUCOSE SERPL-MCNC: 89 MG/DL (ref 65–99)
HDLC SERPL-MCNC: 51 MG/DL
LABORATORY COMMENT REPORT: ABNORMAL
LDLC SERPL CALC-MCNC: 90 MG/DL (ref 0–99)
POTASSIUM SERPL-SCNC: 4 MMOL/L (ref 3.5–5.2)
PROT SERPL-MCNC: 7.3 G/DL (ref 6–8.5)
SODIUM SERPL-SCNC: 143 MMOL/L (ref 134–144)
TRIGL SERPL-MCNC: 159 MG/DL (ref 0–149)
VLDLC SERPL CALC-MCNC: 27 MG/DL (ref 5–40)

## 2021-10-19 ENCOUNTER — OFFICE VISIT (OUTPATIENT)
Dept: MEDICAL GROUP | Facility: PHYSICIAN GROUP | Age: 61
End: 2021-10-19
Payer: COMMERCIAL

## 2021-10-19 VITALS
WEIGHT: 148.37 LBS | HEART RATE: 59 BPM | BODY MASS INDEX: 29.13 KG/M2 | HEIGHT: 60 IN | OXYGEN SATURATION: 98 % | TEMPERATURE: 97.7 F | SYSTOLIC BLOOD PRESSURE: 124 MMHG | DIASTOLIC BLOOD PRESSURE: 80 MMHG

## 2021-10-19 DIAGNOSIS — L02.91 ABSCESS: ICD-10-CM

## 2021-10-19 DIAGNOSIS — Z23 NEED FOR SHINGLES VACCINE: ICD-10-CM

## 2021-10-19 DIAGNOSIS — E78.5 DYSLIPIDEMIA: ICD-10-CM

## 2021-10-19 DIAGNOSIS — Z23 NEED FOR IMMUNIZATION AGAINST INFLUENZA: ICD-10-CM

## 2021-10-19 DIAGNOSIS — R74.8 ELEVATED LIVER ENZYMES: ICD-10-CM

## 2021-10-19 PROBLEM — R74.01 ELEVATED ALANINE AMINOTRANSFERASE (ALT) LEVEL: Status: ACTIVE | Noted: 2021-10-19

## 2021-10-19 PROCEDURE — 90471 IMMUNIZATION ADMIN: CPT | Performed by: FAMILY MEDICINE

## 2021-10-19 PROCEDURE — 90686 IIV4 VACC NO PRSV 0.5 ML IM: CPT | Performed by: FAMILY MEDICINE

## 2021-10-19 PROCEDURE — 90472 IMMUNIZATION ADMIN EACH ADD: CPT | Performed by: FAMILY MEDICINE

## 2021-10-19 PROCEDURE — 99214 OFFICE O/P EST MOD 30 MIN: CPT | Mod: 25 | Performed by: FAMILY MEDICINE

## 2021-10-19 PROCEDURE — 90750 HZV VACC RECOMBINANT IM: CPT | Performed by: FAMILY MEDICINE

## 2021-10-19 RX ORDER — CEPHALEXIN 500 MG/1
500 CAPSULE ORAL 4 TIMES DAILY
Qty: 28 CAPSULE | Refills: 0 | Status: SHIPPED | OUTPATIENT
Start: 2021-10-19

## 2021-10-19 NOTE — PROGRESS NOTES
Problem: Safety  Goal: Will remain free from injury  Outcome: PROGRESSING AS EXPECTED    Problem: Pain Management  Goal: Pain level will decrease to patient’s comfort goal  Outcome: PROGRESSING AS EXPECTED    Problem: Mobility  Goal: Risk for activity intolerance will decrease  Outcome: PROGRESSING AS EXPECTED         Subjective     Dima Leung is a 61 y.o. male who presents with Hyperlipidemia and Immunizations (2nd shingrix)            HPI     Patient returns for follow-up of his medical problems.    He continues to take rosuvastatin for dyslipidemia without side effects.    We have been following him for mild elevation of ALT.  His hepatitis C test came back negative.  He only drinks alcohol occasionally.    He has sore spot on the back of his neck that he just noticed today.    Past medical history, past surgical history, family history reviewed-no changes    Social history reviewed-no changes    Allergies reviewed-no changes    Medications reviewed-no changes    ROS     As per HPI, the rest are negative.           Objective     /80 (BP Location: Left arm, Patient Position: Sitting, BP Cuff Size: Adult)   Pulse (!) 59   Temp 36.5 °C (97.7 °F) (Temporal)   Ht 1.524 m (5')   Wt 67.3 kg (148 lb 5.9 oz)   SpO2 98%   BMI 28.98 kg/m²      Physical Exam     Examined alert, awake, oriented, not in distress    Neck-supple, no lymphadenopathy, no thyromegaly  Lungs-clear to auscultation, no rales, no wheezes  Heart-regular rate and rhythm, no murmur  Extremities-no edema, clubbing, cyanosis  Skin-1.5 x 1 cm indurated, erythematous area the base of the posterior neck consistent with a small abscess, there is a small pointing at the center             Orders Only on 06/11/2021   Component Date Value Ref Range Status   • Glucose 06/11/2021 96  65 - 99 mg/dL Final   • Bun 06/11/2021 12  6 - 24 mg/dL Final   • Creatinine 06/11/2021 0.80  0.76 - 1.27 mg/dL Final   • GFR If Non  06/11/2021 98  >59 mL/min/1.73 Final   • GFR If  06/11/2021 113  >59 mL/min/1.73 Final    Comment: **[facility] currently reports eGFR in compliance with the current**  recommendations of the National Kidney Foundation. [facility] will  update reporting as new guidelines are published from the NKF-ASN  Task force.     •  Bun-Creatinine Ratio 06/11/2021 15  9 - 20 Final   • Sodium 06/11/2021 142  134 - 144 mmol/L Final   • Potassium 06/11/2021 4.4  3.5 - 5.2 mmol/L Final   • Chloride 06/11/2021 105  96 - 106 mmol/L Final   • Co2 06/11/2021 24  20 - 29 mmol/L Final   • Calcium 06/11/2021 9.3  8.7 - 10.2 mg/dL Final   • Total Protein 06/11/2021 7.8  6.0 - 8.5 g/dL Final   • Albumin 06/11/2021 4.5  3.8 - 4.9 g/dL Final   • Globulin 06/11/2021 3.3  1.5 - 4.5 g/dL Final   • A-G Ratio 06/11/2021 1.4  1.2 - 2.2 Final   • Total Bilirubin 06/11/2021 0.6  0.0 - 1.2 mg/dL Final   • Alkaline Phosphatase 06/11/2021 106  48 - 121 IU/L Final   • AST(SGOT) 06/11/2021 32  0 - 40 IU/L Final   • ALT(SGPT) 06/11/2021 59* 0 - 44 IU/L Final   • Cholesterol,Tot 06/11/2021 166  100 - 199 mg/dL Final   • Triglycerides 06/11/2021 112  0 - 149 mg/dL Final   • HDL 06/11/2021 58  >39 mg/dL Final   • VLDL Cholesterol Calc 06/11/2021 20  5 - 40 mg/dL Final   • LDL Chol Calc (NIH) 06/11/2021 88  0 - 99 mg/dL Final   • Comment: 06/11/2021 CANCELED   Final    Result canceled by the ancillary.   • Hepatitis C Antibody 06/11/2021 <0.1  0.0 - 0.9 s/co ratio Final    Comment: Negative:     < 0.8  Indeterminate: 0.8 - 0.9  Positive:     > 0.9  The CDC recommends that a positive HCV antibody result  be followed up with a HCV Nucleic Acid Amplification  test (457761).     • Glucose 10/11/2021 89  65 - 99 mg/dL Final   • Bun 10/11/2021 11  8 - 27 mg/dL Final   • Creatinine 10/11/2021 0.94  0.76 - 1.27 mg/dL Final   • GFR If Non  10/11/2021 88  >59 mL/min/1.73 Final   • GFR If  10/11/2021 101  >59 mL/min/1.73 Final    Comment: **[facility] currently reports eGFR in compliance with the current**  recommendations of the National Kidney Foundation. [facility] will  update reporting as new guidelines are published from the NKF-ASN  Task force.     • Bun-Creatinine Ratio 10/11/2021 12  10 - 24 Final   • Sodium 10/11/2021 143  134 - 144 mmol/L Final   •  Potassium 10/11/2021 4.0  3.5 - 5.2 mmol/L Final   • Chloride 10/11/2021 106  96 - 106 mmol/L Final   • Co2 10/11/2021 23  20 - 29 mmol/L Final   • Calcium 10/11/2021 9.0  8.6 - 10.2 mg/dL Final   • Total Protein 10/11/2021 7.3  6.0 - 8.5 g/dL Final   • Albumin 10/11/2021 4.4  3.8 - 4.9 g/dL Final   • Globulin 10/11/2021 2.9  1.5 - 4.5 g/dL Final   • A-G Ratio 10/11/2021 1.5  1.2 - 2.2 Final   • Total Bilirubin 10/11/2021 0.6  0.0 - 1.2 mg/dL Final   • Alkaline Phosphatase 10/11/2021 122* 44 - 121 IU/L Final    **Please note reference interval change**   • AST(SGOT) 10/11/2021 35  0 - 40 IU/L Final   • ALT(SGPT) 10/11/2021 57* 0 - 44 IU/L Final   • Cholesterol,Tot 10/11/2021 168  100 - 199 mg/dL Final   • Triglycerides 10/11/2021 159* 0 - 149 mg/dL Final   • HDL 10/11/2021 51  >39 mg/dL Final   • VLDL Cholesterol Calc 10/11/2021 27  5 - 40 mg/dL Final   • LDL Chol Calc (NIH) 10/11/2021 90  0 - 99 mg/dL Final   • Comment: 10/11/2021 CANCELED   Final    Result canceled by the ancillary.                  Assessment & Plan        1. Dyslipidemia  All at target except the triglycerides are slightly high.  Advised avoidance of sweets and decreasing the carbs and continue rosuvastatin.    2. Elevated liver enzymes  This time his ALT and alkaline phosphatase are elevated.  Patient still has his gallbladder.  Hep C test negative.  I will get hepatitis a and B tests.  I will proceed with ultrasound of the abdomen complete survey to further evaluate.  - US-ABDOMEN COMPLETE SURVEY; Future  - HEP B SURFACE ANTIGEN; Future  - HEP B SURFACE AB; Future  - HEP A AB TOTAL; Future  - HEP A AB IGM; Future    3. Need for immunization against influenza  Flu shot was given.  - INFLUENZA VACCINE QUAD INJ (PF)    4. Abscess  Small abscess back of the base of the neck.  Cephalexin 500 mg 1 capsule 4 times a day for 7 days.  Advised warm compresses 15 minutes 3 times a day.  Return if there is no improvement or if there is worsening of the  problem.  - cephALEXin (KEFLEX) 500 MG Cap; Take 1 Capsule by mouth 4 times a day.  Dispense: 28 Capsule; Refill: 0    5. Need for shingles vaccine  Second dose of shingles vaccine was given.  Discussed potential side effects and how he can manage them.  - Shingles Vaccine (Shingrix)      Follow-up in 4 months.          Please note that this dictation was created using voice recognition software. I have worked with consultants from the vendor as well as technical experts from [facility] to optimize the interface. I have made every reasonable attempt to correct obvious errors, but I expect that there are errors of grammar and possibly content I did not discover before finalizing the note.

## 2021-11-02 ENCOUNTER — HOSPITAL ENCOUNTER (OUTPATIENT)
Dept: RADIOLOGY | Facility: MEDICAL CENTER | Age: 61
End: 2021-11-02
Attending: FAMILY MEDICINE
Payer: COMMERCIAL

## 2021-11-02 DIAGNOSIS — R74.8 ELEVATED LIVER ENZYMES: ICD-10-CM

## 2021-11-02 LAB
HAV AB SER QL IA: POSITIVE
HBV CORE IGM SERPL QL IA: NEGATIVE
HBV SURFACE AB SER QL: REACTIVE
HBV SURFACE AG SERPL QL IA: NEGATIVE

## 2021-11-02 PROCEDURE — 76700 US EXAM ABDOM COMPLETE: CPT

## 2021-11-03 ENCOUNTER — TELEPHONE (OUTPATIENT)
Dept: MEDICAL GROUP | Facility: PHYSICIAN GROUP | Age: 61
End: 2021-11-03

## 2021-11-03 NOTE — TELEPHONE ENCOUNTER
----- Message from Nicki Grijalva M.D. sent at 11/2/2021 10:09 PM PDT -----  Hepatitis panel came back patient is immune to hepatitis A therefore he will not get this infection in the future.  He is negative to hepatitis B and C which means which means no prior infection, current infection or immunity to hepatitis B and C.    Nicki Grijalva M.D.

## 2021-11-03 NOTE — TELEPHONE ENCOUNTER
Phone Number Called: [phone] (home)       Call outcome: Spoke to patient regarding message below.    Message: Pt notified and has no questions.

## 2021-11-03 NOTE — TELEPHONE ENCOUNTER
Phone Number Called: [phone] (home)       Call outcome: Spoke to patient regarding message below.    Message: Pt notified and has no questions.

## 2021-11-03 NOTE — TELEPHONE ENCOUNTER
----- Message from Nicki Grijalva M.D. sent at 11/2/2021 10:11 PM PDT -----  Ultrasound of the abdomen showed consistent with fat deposits in the liver.  This is most likely the cause of elevation of liver enzymes.  To improve this is to avoid fatty foods, to exercise regularly and to keep a healthy weight.  He is already on cholesterol medication which will also help decrease the fat or cholesterol.  No evidence of stones in the gallbladder.  The rest of the organs are normal.    Nicki Grijalva M.D.

## 2022-02-15 ENCOUNTER — OFFICE VISIT (OUTPATIENT)
Dept: MEDICAL GROUP | Facility: PHYSICIAN GROUP | Age: 62
End: 2022-02-15
Payer: COMMERCIAL

## 2022-02-15 VITALS
WEIGHT: 150.57 LBS | DIASTOLIC BLOOD PRESSURE: 80 MMHG | OXYGEN SATURATION: 96 % | HEIGHT: 60 IN | HEART RATE: 65 BPM | SYSTOLIC BLOOD PRESSURE: 120 MMHG | BODY MASS INDEX: 29.56 KG/M2 | TEMPERATURE: 96.8 F

## 2022-02-15 DIAGNOSIS — R74.8 ELEVATED LIVER ENZYMES: ICD-10-CM

## 2022-02-15 DIAGNOSIS — E78.5 DYSLIPIDEMIA: ICD-10-CM

## 2022-02-15 DIAGNOSIS — Z12.5 SCREENING FOR PROSTATE CANCER: ICD-10-CM

## 2022-02-15 DIAGNOSIS — R68.89 INTOLERANCE TO COLD: ICD-10-CM

## 2022-02-15 PROCEDURE — 99214 OFFICE O/P EST MOD 30 MIN: CPT | Performed by: FAMILY MEDICINE

## 2022-02-15 RX ORDER — ROSUVASTATIN CALCIUM 5 MG/1
5 TABLET, COATED ORAL EVERY EVENING
Qty: 90 TABLET | Refills: 1 | Status: SHIPPED | OUTPATIENT
Start: 2022-02-15 | End: 2022-07-13 | Stop reason: SDUPTHER

## 2022-02-15 ASSESSMENT — PATIENT HEALTH QUESTIONNAIRE - PHQ9: CLINICAL INTERPRETATION OF PHQ2 SCORE: 0

## 2022-02-15 NOTE — PROGRESS NOTES
Subjective     Dima Leung is a 61 y.o. male who presents with Hyperlipidemia            HPI     Patient is here for follow-up of his medical problems.    In terms of his dyslipidemia, he continues to take rosuvastatin without myalgias.    We have been following him for elevated liver enzymes both ALT and alkaline phosphatase.  He said he has stopped drinking alcohol completely since 4 months ago.  We have done viral hepatitis panel which came back he is immune to hepatitis A and negative hepatitis B and C.  His ultrasound of the abdomen complete survey done in November 2021 came back mild uniform increased echogenicity of the liver which may represent fatty liver and the remainder of the study unremarkable.  The gallbladder looked normal, common bile duct and intrahepatic ducts normal.    He is concerned about his intolerance to cold.  This is a longstanding problem.  He said when he is out in the cold he gets chills all over his body but once he is in his car and he has already warmed up this goes away.  Denies any fever, cold sweats.  Denies any weight loss in fact he has gained 2 pounds since last visit.    Past medical history, past surgical history, family history reviewed-no changes    Social history reviewed-no changes    Allergies reviewed-no changes    Medications reviewed-no changes    ROS   As per HPI, the rest are negative.           Objective     /80 (BP Location: Left arm, Patient Position: Sitting, BP Cuff Size: Adult)   Pulse 65   Temp 36 °C (96.8 °F) (Temporal)   Ht 1.524 m (5')   Wt 68.3 kg (150 lb 9.2 oz)   SpO2 96%   BMI 29.41 kg/m²      Physical Exam     Examined alert, awake, oriented, not in distress    Neck-supple, no lymphadenopathy, no thyromegaly  Lungs-clear to auscultation, no rales, no wheezes  Heart-regular rate and rhythm, no murmur  Extremities-no edema, clubbing, cyanosis                        Assessment & Plan        1. Dyslipidemia  Last lipid panel  came back all at goal except mild elevation of triglycerides at 159.  Continue rosuvastatin.  - Lipid Profile; Future  - Comp Metabolic Panel; Future  - CBC WITH DIFFERENTIAL; Future  - TSH; Future  - PROSTATE SPECIFIC AG SCREENING; Future  - rosuvastatin (CRESTOR) 5 MG Tab; Take 1 Tablet by mouth every evening.  Dispense: 90 Tablet; Refill: 1    2. Elevated liver enzymes  Last blood work in October 2021 came back mild elevation of ALT at 57 and mild elevation of alkaline phosphatase at 122.  AST was normal.  Ultrasound of the abdomen complete survey results as per above.  This is most likely multifactorial from the alcohol use, statin, fatty liver.  Patient stopped consuming alcohol completely since 4 months ago.  We will do updated blood work.  - Lipid Profile; Future  - Comp Metabolic Panel; Future  - CBC WITH DIFFERENTIAL; Future  - TSH; Future  - PROSTATE SPECIFIC AG SCREENING; Future    3. Intolerance to cold  We will get TSH to make sure this is not due to thyroid problem.   - Lipid Profile; Future  - Comp Metabolic Panel; Future  - CBC WITH DIFFERENTIAL; Future  - TSH; Future  - PROSTATE SPECIFIC AG SCREENING; Future    4. Screening for prostate cancer  We will do screening PSA with the next blood work.  - Lipid Profile; Future  - Comp Metabolic Panel; Future  - CBC WITH DIFFERENTIAL; Future  - TSH; Future  - PROSTATE SPECIFIC AG SCREENING; Future       Return in 4 months for follow-up.      Please note that this dictation was created using voice recognition software. I have worked with consultants from the vendor as well as technical experts from [employer] [facility]  [employer] to optimize the interface. I have made every reasonable attempt to correct obvious errors, but I expect that there are errors of grammar and possibly content I did not discover before finalizing the note.

## 2022-03-30 LAB
ALBUMIN SERPL-MCNC: 4.6 G/DL (ref 3.8–4.9)
ALBUMIN/GLOB SERPL: 1.5 {RATIO} (ref 1.2–2.2)
ALP SERPL-CCNC: 118 IU/L (ref 44–121)
ALT SERPL-CCNC: 40 IU/L (ref 0–44)
AST SERPL-CCNC: 23 IU/L (ref 0–40)
BASOPHILS # BLD AUTO: 0.1 X10E3/UL (ref 0–0.2)
BASOPHILS NFR BLD AUTO: 1 %
BILIRUB SERPL-MCNC: 0.7 MG/DL (ref 0–1.2)
BUN SERPL-MCNC: 15 MG/DL (ref 8–27)
BUN/CREAT SERPL: 17 (ref 10–24)
CALCIUM SERPL-MCNC: 9.5 MG/DL (ref 8.6–10.2)
CHLORIDE SERPL-SCNC: 104 MMOL/L (ref 96–106)
CHOLEST SERPL-MCNC: 193 MG/DL (ref 100–199)
CO2 SERPL-SCNC: 24 MMOL/L (ref 20–29)
CREAT SERPL-MCNC: 0.89 MG/DL (ref 0.76–1.27)
EGFRCR SERPLBLD CKD-EPI 2021: 98 ML/MIN/1.73
EOSINOPHIL # BLD AUTO: 0.2 X10E3/UL (ref 0–0.4)
EOSINOPHIL NFR BLD AUTO: 3 %
ERYTHROCYTE [DISTWIDTH] IN BLOOD BY AUTOMATED COUNT: 13.1 % (ref 11.6–15.4)
GLOBULIN SER CALC-MCNC: 3.1 G/DL (ref 1.5–4.5)
GLUCOSE SERPL-MCNC: 91 MG/DL (ref 65–99)
HCT VFR BLD AUTO: 49.6 % (ref 37.5–51)
HDLC SERPL-MCNC: 59 MG/DL
HGB BLD-MCNC: 16.5 G/DL (ref 13–17.7)
IMM GRANULOCYTES # BLD AUTO: 0 X10E3/UL (ref 0–0.1)
IMM GRANULOCYTES NFR BLD AUTO: 0 %
IMMATURE CELLS  115398: NORMAL
LABORATORY COMMENT REPORT: ABNORMAL
LDLC SERPL CALC-MCNC: 109 MG/DL (ref 0–99)
LYMPHOCYTES # BLD AUTO: 3.1 X10E3/UL (ref 0.7–3.1)
LYMPHOCYTES NFR BLD AUTO: 42 %
MCH RBC QN AUTO: 29.1 PG (ref 26.6–33)
MCHC RBC AUTO-ENTMCNC: 33.3 G/DL (ref 31.5–35.7)
MCV RBC AUTO: 88 FL (ref 79–97)
MONOCYTES # BLD AUTO: 0.6 X10E3/UL (ref 0.1–0.9)
MONOCYTES NFR BLD AUTO: 8 %
MORPHOLOGY BLD-IMP: NORMAL
NEUTROPHILS # BLD AUTO: 3.3 X10E3/UL (ref 1.4–7)
NEUTROPHILS NFR BLD AUTO: 46 %
NRBC BLD AUTO-RTO: NORMAL %
PLATELET # BLD AUTO: 259 X10E3/UL (ref 150–450)
POTASSIUM SERPL-SCNC: 4.5 MMOL/L (ref 3.5–5.2)
PROT SERPL-MCNC: 7.7 G/DL (ref 6–8.5)
PSA SERPL-MCNC: 2.4 NG/ML (ref 0–4)
RBC # BLD AUTO: 5.67 X10E6/UL (ref 4.14–5.8)
SODIUM SERPL-SCNC: 142 MMOL/L (ref 134–144)
TRIGL SERPL-MCNC: 140 MG/DL (ref 0–149)
TSH SERPL DL<=0.005 MIU/L-ACNC: 2.94 UIU/ML (ref 0.45–4.5)
VLDLC SERPL CALC-MCNC: 25 MG/DL (ref 5–40)
WBC # BLD AUTO: 7.3 X10E3/UL (ref 3.4–10.8)

## 2022-03-31 ENCOUNTER — TELEPHONE (OUTPATIENT)
Dept: MEDICAL GROUP | Facility: PHYSICIAN GROUP | Age: 62
End: 2022-03-31
Payer: COMMERCIAL

## 2022-03-31 NOTE — TELEPHONE ENCOUNTER
----- Message from Nicki Grijalva M.D. sent at 3/30/2022  6:45 PM PDT -----  Blood work came back no anemia.  Liver enzymes are now back to normal.  Triglycerides are now normal and decreased from 159-140.  The goal is below 150.  Avoid the sweets and decrease the carbs to keep the triglycerides down.  LDL or bad cholesterol is now slightly high at 109.  This needs to be below 100.  Avoid fatty foods.  Eat more vegetables, fruits, fish.  Exercise regularly and keep a healthy weight.  Continue cholesterol medication.  TSH or thyroid blood work is normal.  PSA is normal.    Nicki Grijalva M.D.

## 2022-03-31 NOTE — TELEPHONE ENCOUNTER
Phone Number Called: [phone]    Call outcome: Spoke to patient regarding message below.    Message: Spoke to pt and informed pt of his recent lab results. Pt has been informed.

## 2022-06-14 ENCOUNTER — OFFICE VISIT (OUTPATIENT)
Dept: MEDICAL GROUP | Facility: PHYSICIAN GROUP | Age: 62
End: 2022-06-14
Payer: COMMERCIAL

## 2022-06-14 VITALS
WEIGHT: 150.79 LBS | BODY MASS INDEX: 29.61 KG/M2 | DIASTOLIC BLOOD PRESSURE: 90 MMHG | SYSTOLIC BLOOD PRESSURE: 150 MMHG | OXYGEN SATURATION: 97 % | HEIGHT: 60 IN | HEART RATE: 61 BPM | TEMPERATURE: 97.3 F

## 2022-06-14 DIAGNOSIS — E78.5 DYSLIPIDEMIA: ICD-10-CM

## 2022-06-14 DIAGNOSIS — Z72.51 HIGH RISK SEXUAL BEHAVIOR, UNSPECIFIED TYPE: ICD-10-CM

## 2022-06-14 DIAGNOSIS — R74.8 ELEVATED LIVER ENZYMES: ICD-10-CM

## 2022-06-14 DIAGNOSIS — R03.0 ELEVATED BLOOD PRESSURE READING: ICD-10-CM

## 2022-06-14 PROCEDURE — 99214 OFFICE O/P EST MOD 30 MIN: CPT | Performed by: FAMILY MEDICINE

## 2022-06-14 ASSESSMENT — FIBROSIS 4 INDEX: FIB4 SCORE: 0.84

## 2022-06-14 NOTE — PROGRESS NOTES
Subjective     Dima Leung is a 61 y.o. male who presents with Hyperlipidemia            HPI     Patient returns for follow-up.    He continues to take rosuvastatin for dyslipidemia.    In terms of elevated liver enzymes, this was most likely multifactorial from alcohol use, statin use, fatty liver disease.  He completely stopped drinking alcohol 8 months ago.    He went to the [facility] for vacation 2 months ago.  He said he had unprotected sex with a 1 night stand.  He denies any symptoms of STD.  He has not seen any lesions in the genital area.  He is asking for antibiotics in case he contracted STD.    His blood pressure is elevated today.  No previous history of hypertension.  He had elevation of blood pressure reading last year which went down spontaneously.  He admits he has not been good in watching his diet.  He said he is not watching the fat intake.    Past medical history, past surgical history, family history reviewed-no changes    Social history reviewed-no changes    Allergies reviewed-no changes    Medications reviewed-no changes        ROS     As per HPI, the rest are negative.           Objective     BP (!) 150/90   Pulse 61   Temp 36.3 °C (97.3 °F) (Temporal)   Ht 1.524 m (5')   Wt 68.4 kg (150 lb 12.7 oz)   SpO2 97%   BMI 29.45 kg/m²      Physical Exam     Examined alert, awake, oriented, not in distress    Neck-supple, no lymphadenopathy, no thyromegaly  Lungs-clear to auscultation, no rales, no wheezes  Heart-regular rate and rhythm, no murmur  Extremities-no edema, clubbing, cyanosis          Orders Only on 06/11/2021   Component Date Value Ref Range Status   • Glucose 06/11/2021 96  65 - 99 mg/dL Final   • Bun 06/11/2021 12  6 - 24 mg/dL Final   • Creatinine 06/11/2021 0.80  0.76 - 1.27 mg/dL Final   • GFR If Non  06/11/2021 98  >59 mL/min/1.73 Final   • GFR If  06/11/2021 113  >59 mL/min/1.73 Final    Comment: **[facility] currently  reports eGFR in compliance with the current**  recommendations of the National Kidney Foundation. [facility] will  update reporting as new guidelines are published from the NKF-ASN  Task force.     • Bun-Creatinine Ratio 06/11/2021 15  9 - 20 Final   • Sodium 06/11/2021 142  134 - 144 mmol/L Final   • Potassium 06/11/2021 4.4  3.5 - 5.2 mmol/L Final   • Chloride 06/11/2021 105  96 - 106 mmol/L Final   • Co2 06/11/2021 24  20 - 29 mmol/L Final   • Calcium 06/11/2021 9.3  8.7 - 10.2 mg/dL Final   • Total Protein 06/11/2021 7.8  6.0 - 8.5 g/dL Final   • Albumin 06/11/2021 4.5  3.8 - 4.9 g/dL Final   • Globulin 06/11/2021 3.3  1.5 - 4.5 g/dL Final   • A-G Ratio 06/11/2021 1.4  1.2 - 2.2 Final   • Total Bilirubin 06/11/2021 0.6  0.0 - 1.2 mg/dL Final   • Alkaline Phosphatase 06/11/2021 106  48 - 121 IU/L Final   • AST(SGOT) 06/11/2021 32  0 - 40 IU/L Final   • ALT(SGPT) 06/11/2021 59 (A) 0 - 44 IU/L Final   • Cholesterol,Tot 06/11/2021 166  100 - 199 mg/dL Final   • Triglycerides 06/11/2021 112  0 - 149 mg/dL Final   • HDL 06/11/2021 58  >39 mg/dL Final   • VLDL Cholesterol Calc 06/11/2021 20  5 - 40 mg/dL Final   • LDL Chol Calc (NIH) 06/11/2021 88  0 - 99 mg/dL Final   • Comment: 06/11/2021 CANCELED   Final    Result canceled by the ancillary.   • Hepatitis C Antibody 06/11/2021 <0.1  0.0 - 0.9 s/co ratio Final    Comment: Negative:     < 0.8  Indeterminate: 0.8 - 0.9  Positive:     > 0.9  The CDC recommends that a positive HCV antibody result  be followed up with a HCV Nucleic Acid Amplification  test (260501).     • Glucose 10/11/2021 89  65 - 99 mg/dL Final   • Bun 10/11/2021 11  8 - 27 mg/dL Final   • Creatinine 10/11/2021 0.94  0.76 - 1.27 mg/dL Final   • GFR If Non  10/11/2021 88  >59 mL/min/1.73 Final   • GFR If  10/11/2021 101  >59 mL/min/1.73 Final    Comment: **[facility] currently reports eGFR in compliance with the current**  recommendations of the National Kidney Foundation.  [facility] will  update reporting as new guidelines are published from the NKF-ASN  Task force.     • Bun-Creatinine Ratio 10/11/2021 12  10 - 24 Final   • Sodium 10/11/2021 143  134 - 144 mmol/L Final   • Potassium 10/11/2021 4.0  3.5 - 5.2 mmol/L Final   • Chloride 10/11/2021 106  96 - 106 mmol/L Final   • Co2 10/11/2021 23  20 - 29 mmol/L Final   • Calcium 10/11/2021 9.0  8.6 - 10.2 mg/dL Final   • Total Protein 10/11/2021 7.3  6.0 - 8.5 g/dL Final   • Albumin 10/11/2021 4.4  3.8 - 4.9 g/dL Final   • Globulin 10/11/2021 2.9  1.5 - 4.5 g/dL Final   • A-G Ratio 10/11/2021 1.5  1.2 - 2.2 Final   • Total Bilirubin 10/11/2021 0.6  0.0 - 1.2 mg/dL Final   • Alkaline Phosphatase 10/11/2021 122 (A) 44 - 121 IU/L Final    **Please note reference interval change**   • AST(SGOT) 10/11/2021 35  0 - 40 IU/L Final   • ALT(SGPT) 10/11/2021 57 (A) 0 - 44 IU/L Final   • Cholesterol,Tot 10/11/2021 168  100 - 199 mg/dL Final   • Triglycerides 10/11/2021 159 (A) 0 - 149 mg/dL Final   • HDL 10/11/2021 51  >39 mg/dL Final   • VLDL Cholesterol Calc 10/11/2021 27  5 - 40 mg/dL Final   • LDL Chol Calc (NIH) 10/11/2021 90  0 - 99 mg/dL Final   • Comment: 10/11/2021 CANCELED   Final    Result canceled by the ancillary.   • Hep B Surgace Ab, Qual 11/01/2021 Reactive   Final    Comment: Non Reactive: Inconsistent with immunity,  less than 10 mIU/mL  Reactive:     Consistent with immunity,  greater than 9.9 mIU/mL     • Hepatitis B Surface Antigen 11/01/2021 Negative  Negative Final   • Hep A Virus Antibody Total 11/01/2021 Positive (A) Negative Final   • Hepatitis B Cors Ab,IgM 11/01/2021 Negative  Negative Final   • WBC 03/29/2022 7.3  3.4 - 10.8 x10E3/uL Final   • RBC 03/29/2022 5.67  4.14 - 5.80 x10E6/uL Final   • Hemoglobin 03/29/2022 16.5  13.0 - 17.7 g/dL Final   • Hematocrit 03/29/2022 49.6  37.5 - 51.0 % Final   • MCV 03/29/2022 88  79 - 97 fL Final   • MCH 03/29/2022 29.1  26.6 - 33.0 pg Final   • MCHC 03/29/2022 33.3  31.5 - 35.7 g/dL  Final   • RDW 03/29/2022 13.1  11.6 - 15.4 % Final   • Platelet Count 03/29/2022 259  150 - 450 x10E3/uL Final   • Neutrophils-Polys 03/29/2022 46  Not Estab. % Final   • Lymphocytes 03/29/2022 42  Not Estab. % Final   • Monocytes 03/29/2022 8  Not Estab. % Final   • Eosinophils 03/29/2022 3  Not Estab. % Final   • Basophils 03/29/2022 1  Not Estab. % Final   • Immature Cells 03/29/2022 CANCELED   Final    Result canceled by the ancillary.   • Neutrophils (Absolute) 03/29/2022 3.3  1.4 - 7.0 x10E3/uL Final   • Lymphs (Absolute) 03/29/2022 3.1  0.7 - 3.1 x10E3/uL Final   • Monos (Absolute) 03/29/2022 0.6  0.1 - 0.9 x10E3/uL Final   • Eos (Absolute) 03/29/2022 0.2  0.0 - 0.4 x10E3/uL Final   • Baso (Absolute) 03/29/2022 0.1  0.0 - 0.2 x10E3/uL Final   • Immature Granulocytes 03/29/2022 0  Not Estab. % Final   • Immature Granulocytes (abs) 03/29/2022 0.0  0.0 - 0.1 x10E3/uL Final   • Nucleated RBC 03/29/2022 CANCELED   Final    Result canceled by the ancillary.   • Comments-Diff 03/29/2022 CANCELED   Final    Result canceled by the ancillary.   • Glucose 03/29/2022 91  65 - 99 mg/dL Final   • Bun 03/29/2022 15  8 - 27 mg/dL Final   • Creatinine 03/29/2022 0.89  0.76 - 1.27 mg/dL Final   • eGFR 03/29/2022 98  >59 mL/min/1.73 Final   • Bun-Creatinine Ratio 03/29/2022 17  10 - 24 Final   • Sodium 03/29/2022 142  134 - 144 mmol/L Final   • Potassium 03/29/2022 4.5  3.5 - 5.2 mmol/L Final   • Chloride 03/29/2022 104  96 - 106 mmol/L Final   • Co2 03/29/2022 24  20 - 29 mmol/L Final   • Calcium 03/29/2022 9.5  8.6 - 10.2 mg/dL Final   • Total Protein 03/29/2022 7.7  6.0 - 8.5 g/dL Final   • Albumin 03/29/2022 4.6  3.8 - 4.9 g/dL Final   • Globulin 03/29/2022 3.1  1.5 - 4.5 g/dL Final   • A-G Ratio 03/29/2022 1.5  1.2 - 2.2 Final   • Total Bilirubin 03/29/2022 0.7  0.0 - 1.2 mg/dL Final   • Alkaline Phosphatase 03/29/2022 118  44 - 121 IU/L Final   • AST(SGOT) 03/29/2022 23  0 - 40 IU/L Final   • ALT(SGPT) 03/29/2022 40  0 -  44 IU/L Final   • Cholesterol,Tot 03/29/2022 193  100 - 199 mg/dL Final   • Triglycerides 03/29/2022 140  0 - 149 mg/dL Final   • HDL 03/29/2022 59  >39 mg/dL Final   • VLDL Cholesterol Calc 03/29/2022 25  5 - 40 mg/dL Final   • LDL Chol Calc ([facility]) 03/29/2022 109 (A) 0 - 99 mg/dL Final   • Comment: 03/29/2022 CANCELED   Final    Result canceled by the ancillary.   • TSH 03/29/2022 2.940  0.450 - 4.500 uIU/mL Final   • Prostatic Specific Antigen Tot 03/29/2022 2.4  0.0 - 4.0 ng/mL Final    Comment: Roche ECLIA methodology.  According to the American Urological Association, Serum PSA should  decrease and remain at undetectable levels after radical  prostatectomy. The AUA defines biochemical recurrence as an initial  PSA value 0.2 ng/mL or greater followed by a subsequent confirmatory  PSA value 0.2 ng/mL or greater.  Values obtained with different assay methods or kits cannot be used  interchangeably. Results cannot be interpreted as absolute evidence  of the presence or absence of malignant disease.                       Assessment & Plan        1. Dyslipidemia  Last LDL cholesterol was already slightly high at 109.  Advised to watch the fat intake.  Continue rosuvastatin.  Recheck blood work in the next few months.    2. Elevated liver enzymes  Resolved after he abstain from alcohol.  Continue alcohol abstinence.  Recheck blood work in the next few months.    3. High risk sexual behavior, unspecified type  We will do STD screen.  Discussed protection with condoms to avoid STD.  - HEP C VIRUS ANTIBODY; Future  - T.PALLIDUM AB EIA; Future  - HIV AG/AB COMBO ASSAY SCREENING; Future  - CHLAMYDIA/GC PCR URINE; Future    4. Elevated blood pressure reading  He will start monitoring his blood pressure at home and keep a record.  He will bring his blood pressure machine in the lab next visit.  Discussed proper way to check blood pressure.  I will follow him up in a month.  Advised avoidance of salty foods, regular exercises,  watching the fat intake.    Follow-up in a month.      Please note that this dictation was created using voice recognition software. I have worked with consultants from the vendor as well as technical experts from [facility]  [employer] to optimize the interface. I have made every reasonable attempt to correct obvious errors, but I expect that there are errors of grammar and possibly content I did not discover before finalizing the note.

## 2022-06-18 LAB
C TRACH RRNA SPEC QL NAA+PROBE: NEGATIVE
HCV AB S/CO SERPL IA: <0.1 S/CO RATIO (ref 0–0.9)
HIV 1+2 AB+HIV1 P24 AG SERPL QL IA: NON REACTIVE
N GONORRHOEA RRNA SPEC QL NAA+PROBE: NEGATIVE
TREPONEMA PALLIDUM IGG+IGM AB [PRESENCE] IN SERUM OR PLASMA BY IMMUNOASSAY: NON REACTIVE

## 2022-07-13 ENCOUNTER — OFFICE VISIT (OUTPATIENT)
Dept: MEDICAL GROUP | Facility: PHYSICIAN GROUP | Age: 62
End: 2022-07-13
Payer: COMMERCIAL

## 2022-07-13 VITALS
BODY MASS INDEX: 28.91 KG/M2 | HEIGHT: 60 IN | DIASTOLIC BLOOD PRESSURE: 80 MMHG | SYSTOLIC BLOOD PRESSURE: 150 MMHG | HEART RATE: 66 BPM | WEIGHT: 147.27 LBS | OXYGEN SATURATION: 98 % | TEMPERATURE: 98.5 F

## 2022-07-13 DIAGNOSIS — E78.5 DYSLIPIDEMIA: ICD-10-CM

## 2022-07-13 DIAGNOSIS — I10 PRIMARY HYPERTENSION: ICD-10-CM

## 2022-07-13 DIAGNOSIS — Z72.51 HIGH RISK SEXUAL BEHAVIOR, UNSPECIFIED TYPE: ICD-10-CM

## 2022-07-13 DIAGNOSIS — M54.6 CHRONIC BILATERAL THORACIC BACK PAIN: ICD-10-CM

## 2022-07-13 DIAGNOSIS — G89.29 CHRONIC BILATERAL THORACIC BACK PAIN: ICD-10-CM

## 2022-07-13 PROCEDURE — 99214 OFFICE O/P EST MOD 30 MIN: CPT | Performed by: FAMILY MEDICINE

## 2022-07-13 RX ORDER — ROSUVASTATIN CALCIUM 5 MG/1
5 TABLET, COATED ORAL EVERY EVENING
Qty: 90 TABLET | Refills: 1 | Status: SHIPPED | OUTPATIENT
Start: 2022-07-13 | End: 2022-10-19 | Stop reason: SDUPTHER

## 2022-07-13 RX ORDER — LISINOPRIL 10 MG/1
10 TABLET ORAL DAILY
Qty: 30 TABLET | Refills: 1 | Status: SHIPPED | OUTPATIENT
Start: 2022-07-13 | End: 2022-07-13

## 2022-07-13 RX ORDER — LISINOPRIL 10 MG/1
10 TABLET ORAL DAILY
Qty: 90 TABLET | Refills: 0 | Status: SHIPPED | OUTPATIENT
Start: 2022-07-13 | End: 2022-08-09

## 2022-07-13 ASSESSMENT — FIBROSIS 4 INDEX: FIB4 SCORE: 0.84

## 2022-07-13 NOTE — LETTER
July 13, 2022         Patient: Dima Leung   YOB: 1960   Date of Visit: 7/13/2022           To Whom it May Concern:    Dima Leung was seen in my clinic on 7/13/2022.     He suffers from chronic thoracic pain due to arthritis. Patient is recommended not to lift more than 40 lbs.    If you have any questions or concerns, please don't hesitate to call.        Sincerely,           Nicki Grijalva M.D.  Electronically Signed

## 2022-07-13 NOTE — LETTER
July 13, 2022         Patient: Dima Leung   YOB: 1960   Date of Visit: 7/13/2022           To Whom it May Concern:    Dima Leung was seen in my clinic on 7/13/2022.    He suffers from chronic thoracic pain due to arthritis. He is recommended not to lift over 40 lbs indefinitely.    If you have any questions or concerns, please don't hesitate to call.        Sincerely,           Nicki Grijalva M.D.  Electronically Signed

## 2022-07-14 NOTE — PROGRESS NOTES
Subjective     Dima Leung is a 61 y.o. male who presents with Hypertension Follow-up            HPI     Patient is here for follow-up of his medical problems.    When I saw him on his last visit a month ago, his blood pressure was elevated with no prior history of hypertension.  I had him watch his salt intake.  Blood pressure is still elevated on today's visit.  Denies any headache, dizziness, lightheadedness, chest pain, palpitations, shortness of breath.    In terms of dyslipidemia, he continues to take Crestor without myalgias.    Per patient he has on and off pain in the lower thoracic area.  He said he sustained a work-related injury in 2020 and he was treated at [facility] conservatively with physical therapy.  His x-ray of the thoracic spine at that time came back multilevel severe degenerative disc disease of the mid and lower thoracic spine.  He said since then he gets pain when he does heavy lifting.  He said they live 50 pounds and higher at work.  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  He would like a work note that he should not be lifting more than 40 pounds because of his back.  He said sometimes there is pain going down the legs and sometimes there is numbness and tingling down the legs.  He does not take any medication for pain.    I sent him for STD screening because he had unprotected 1 night stand when he was on vacation in the [facility].    Past medical history, past surgical history, family history reviewed-no changes    Social history reviewed-no changes    Allergies reviewed-no changes    Medications reviewed-no changes      ROS     As per HPI, the rest are negative.           Objective     BP (!) 150/80   Pulse 66   Temp 36.9 °C (98.5 °F) (Temporal)   Ht 1.524 m (5')   Wt 66.8 kg (147 lb 4.3 oz)   SpO2 98%   BMI 28.76 kg/m²      Physical Exam     Examined alert, awake, oriented, not in distress    Neck-supple, no  lymphadenopathy, no thyromegaly  Lungs-clear to auscultation, no rales, no wheezes  Heart-regular rate and rhythm, no murmur  Extremities-no edema, clubbing, cyanosis  Back exam-no pinpoint tenderness spinous processes lumbosacral spine, no spasm of the paraspinal muscles  Neuro exam-motor strength 5/5 lower extremity flexors and extensors, DTRs 2+ lower extremities, sensation intact to light touch lower extremities    Orders Only on 06/17/2022   Component Date Value Ref Range Status   • Chlamydia Trachomatis, JUAREZ 06/17/2022 Negative  Negative Final   • N. Gonorrhoeae JUAREZ 06/17/2022 Negative  Negative Final   • T. Pallidum Ab 06/17/2022 Non Reactive  Non Reactive Final   • HIV Screen 4th Gen. wRfx 06/17/2022 Non Reactive  Non Reactive Final    Comment: HIV Negative  HIV-1/HIV-2 antibodies and HIV-1 p24 antigen were NOT detected.  There is no laboratory evidence of HIV infection.     • Hepatitis C Antibody 06/17/2022 <0.1  0.0 - 0.9 s/co ratio Final    Comment: Negative:     < 0.8  Indeterminate: 0.8 - 0.9  Positive:     > 0.9  HCV antibody alone does not differentiate between  previous resolved infection and active infection.  The CDC and current clinical guidelines recommend  that a positive HCV antibody result be followed up  with an HCV RNA test to support the diagnosis of  acute HCV infection. [facility] offers Hepatitis C  Virus (HCV) RNA, Diagnosis, JUAREZ (266007) and  Hepatitis C Virus (HCV) Antibody with reflex to  Quantitative Real-time PCR (334402).                               Assessment & Plan        1. Primary hypertension  Blood pressure remains elevated and not improved with conservative measures.  We discussed starting medication.  I will start him on lisinopril 10 mg 1 tablet daily.  Made aware of potential side effects.  He will monitor his blood pressure at home and keep a record.  I calibrated his machine and his machine is accurate.  He was made aware of potential side effects.    2. Chronic  bilateral thoracic back pain  This is due to severe degenerative disc disease in the mid thoracic and lower thoracic spine per x-ray.  I gave him a work note that he cannot lift more than 40 pounds.    3. Dyslipidemia  The last lipid panel came back the LDL cholesterol increased from .  The goal is below 100.  Advised to watch the diet more closely in terms of avoidance of fatty foods.  Continue rosuvastatin.   - rosuvastatin (CRESTOR) 5 MG Tab; Take 1 Tablet by mouth every evening.  Dispense: 90 Tablet; Refill: 1    4.  High risk sexual behavior, unspecified type  I reviewed with him results of the STD screening which all came back negative for HIV, hepatitis C, syphilis, chlamydia and gonorrhea.  Advised to always use protection with condom during sexual intercourse.    Return for follow-up in a month to check her blood pressure.      Please note that this dictation was created using voice recognition software. I have worked with consultants from the vendor as well as technical experts from [facility] to optimize the interface. I have made every reasonable attempt to correct obvious errors, but I expect that there are errors of grammar and possibly content I did not discover before finalizing the note.

## 2022-08-18 ENCOUNTER — OFFICE VISIT (OUTPATIENT)
Dept: MEDICAL GROUP | Facility: PHYSICIAN GROUP | Age: 62
End: 2022-08-18
Payer: COMMERCIAL

## 2022-08-18 VITALS
DIASTOLIC BLOOD PRESSURE: 64 MMHG | TEMPERATURE: 97.6 F | WEIGHT: 147.8 LBS | BODY MASS INDEX: 29.02 KG/M2 | HEART RATE: 60 BPM | RESPIRATION RATE: 20 BRPM | SYSTOLIC BLOOD PRESSURE: 102 MMHG | HEIGHT: 60 IN | OXYGEN SATURATION: 97 %

## 2022-08-18 DIAGNOSIS — I10 PRIMARY HYPERTENSION: ICD-10-CM

## 2022-08-18 PROCEDURE — 99213 OFFICE O/P EST LOW 20 MIN: CPT | Performed by: FAMILY MEDICINE

## 2022-08-18 RX ORDER — LOSARTAN POTASSIUM 25 MG/1
25 TABLET ORAL DAILY
Qty: 90 TABLET | Refills: 1 | Status: SHIPPED | OUTPATIENT
Start: 2022-08-18 | End: 2022-11-07 | Stop reason: SDUPTHER

## 2022-08-18 ASSESSMENT — FIBROSIS 4 INDEX: FIB4 SCORE: 0.86

## 2022-08-18 NOTE — PROGRESS NOTES
Subjective     Dima Leung is a 62 y.o. male who presents with Hypertension            HPI    Patient is here for follow-up of hypertension.  A month ago I started him on lisinopril 10 mg 1 tablet daily.  He said his home blood pressure readings have been running in the low 100s over 70s.  He said he feels some lightheadedness and he also has dry cough which wakes him up at night which he did not have before he started taking lisinopril.    Past medical history, past surgical history, family history reviewed-no changes    Social history reviewed-no changes    Allergies reviewed-no changes    Medications reviewed-no changes    ROS    As per HPI, the rest are negative.           Objective     /64 (BP Location: Left arm, Patient Position: Sitting, BP Cuff Size: Adult)   Pulse 60   Temp 36.4 °C (97.6 °F) (Temporal)   Resp 20   Ht 1.524 m (5')   Wt 67 kg (147 lb 12.8 oz)   SpO2 97%   BMI 28.87 kg/m²      Physical Exam    Examined alert, awake, oriented, not in distress    Neck-supple, no lymphadenopathy, no thyromegaly  Lungs-clear to auscultation, no rales, no wheezes  Heart-regular rate and rhythm, no murmur  Extremities-no edema, clubbing, cyanosis                        Assessment & Plan        1. Primary hypertension  I rechecked his blood pressure and it was 110/70.  His blood pressure is now very well controlled.  He however has a dry cough which is bothersome to him.  I will switch him to losartan 25 mg 1 tablet daily but he will continue to monitor his blood pressure regularly on a daily basis and bring his records on his return.  - losartan (COZAAR) 25 MG Tab; Take 1 Tablet by mouth every day.  Dispense: 90 Tablet; Refill: 1       He will see me in 2 weeks.  He was made aware I am leaving the practice to relocate to [place] and so he will establish care with another PCP.      Please note that this dictation was created using voice recognition software. I have worked with  consultants from the vendor as well as technical experts from [facility] to optimize the interface. I have made every reasonable attempt to correct obvious errors, but I expect that there are errors of grammar and possibly content I did not discover before finalizing the note.

## 2022-09-01 ENCOUNTER — OFFICE VISIT (OUTPATIENT)
Dept: MEDICAL GROUP | Facility: PHYSICIAN GROUP | Age: 62
End: 2022-09-01
Payer: COMMERCIAL

## 2022-09-01 VITALS
TEMPERATURE: 98 F | OXYGEN SATURATION: 97 % | WEIGHT: 145.5 LBS | DIASTOLIC BLOOD PRESSURE: 60 MMHG | HEIGHT: 60 IN | BODY MASS INDEX: 28.57 KG/M2 | HEART RATE: 63 BPM | SYSTOLIC BLOOD PRESSURE: 106 MMHG

## 2022-09-01 DIAGNOSIS — T46.4X5A ACE-INHIBITOR COUGH: ICD-10-CM

## 2022-09-01 DIAGNOSIS — I10 PRIMARY HYPERTENSION: ICD-10-CM

## 2022-09-01 DIAGNOSIS — R05.8 ACE-INHIBITOR COUGH: ICD-10-CM

## 2022-09-01 PROCEDURE — 99213 OFFICE O/P EST LOW 20 MIN: CPT | Performed by: FAMILY MEDICINE

## 2022-09-01 ASSESSMENT — FIBROSIS 4 INDEX: FIB4 SCORE: 0.86

## 2022-09-01 NOTE — PROGRESS NOTES
Subjective     Dima Leung is a 62 y.o. male who presents with Hypertension            HPI    Patient is here for follow-up.    I saw him 2 weeks ago for follow-up of his hypertension.  Blood pressure was already under control on lisinopril but he developed dry bothersome cough that wakes him up from his sleep and so I switched him to losartan 25 mg 1 tablet daily.  He said this works very well for him.  He said the cough is almost completely gone.  His blood pressure is running good on losartan.    Past medical history, past surgical history, family history reviewed-no changes    Social history reviewed-no changes    Allergies reviewed-no changes    Medications reviewed-no changes    ROS    As per HPI.           Objective     /60 (BP Location: Left arm, Patient Position: Sitting, BP Cuff Size: Adult)   Pulse 63   Temp 36.7 °C (98 °F) (Temporal)   Ht 1.524 m (5')   Wt 66 kg (145 lb 8.1 oz)   SpO2 97%   BMI 28.42 kg/m²      Physical Exam    Examined alert, awake, oriented, not in distress    Neck-supple, no lymphadenopathy, no thyromegaly  Lungs-clear to auscultation, no rales, no wheezes  Heart-regular rate and rhythm, no murmur  Extremities-no edema, clubbing, cyanosis                        Assessment & Plan        1. Primary hypertension  We switched him from lisinopril to losartan because of dry bothersome cough.  Blood pressure running good on losartan.  The cough is almost 100% resolved.  Patient will continue on losartan.    2. ACE-inhibitor cough  Cough almost 100% resolved after we switched him from lisinopril to losartan.  Continue losartan.    He will establish care with another PCP as I am relocating to [place].      Please note that this dictation was created using voice recognition software. I have worked with consultants from the vendor as well as technical experts from [employer] to optimize the interface. I have made every reasonable attempt to correct obvious  errors, but I expect that there are errors of grammar and possibly content I did not discover before finalizing the note.

## 2022-10-19 DIAGNOSIS — E78.5 DYSLIPIDEMIA: ICD-10-CM

## 2022-10-19 RX ORDER — ROSUVASTATIN CALCIUM 5 MG/1
5 TABLET, COATED ORAL EVERY EVENING
Qty: 90 TABLET | Refills: 0 | Status: SHIPPED | OUTPATIENT
Start: 2022-10-19

## 2022-11-07 DIAGNOSIS — I10 PRIMARY HYPERTENSION: ICD-10-CM

## 2022-11-07 RX ORDER — LOSARTAN POTASSIUM 25 MG/1
25 TABLET ORAL DAILY
Qty: 90 TABLET | Refills: 0 | Status: SHIPPED | OUTPATIENT
Start: 2022-11-07

## 2025-03-23 ENCOUNTER — OFFICE VISIT (OUTPATIENT)
Dept: URGENT CARE | Facility: CLINIC | Age: 65
End: 2025-03-23
Payer: COMMERCIAL

## 2025-03-23 VITALS
WEIGHT: 152.8 LBS | HEIGHT: 64 IN | SYSTOLIC BLOOD PRESSURE: 126 MMHG | HEART RATE: 78 BPM | TEMPERATURE: 97.6 F | DIASTOLIC BLOOD PRESSURE: 72 MMHG | BODY MASS INDEX: 26.09 KG/M2 | OXYGEN SATURATION: 97 %

## 2025-03-23 DIAGNOSIS — B96.89 BACTERIAL SINUSITIS: ICD-10-CM

## 2025-03-23 DIAGNOSIS — J32.9 BACTERIAL SINUSITIS: ICD-10-CM

## 2025-03-23 PROCEDURE — 99213 OFFICE O/P EST LOW 20 MIN: CPT | Performed by: FAMILY MEDICINE

## 2025-03-23 PROCEDURE — 3074F SYST BP LT 130 MM HG: CPT | Performed by: FAMILY MEDICINE

## 2025-03-23 PROCEDURE — 3078F DIAST BP <80 MM HG: CPT | Performed by: FAMILY MEDICINE

## 2025-03-23 NOTE — PROGRESS NOTES
"  Subjective:      64 y.o. male presents to urgent care for cold symptoms that started approximately 1 week ago.  He is experiencing headache, nasal congestion, sore throat, and cough. No tobacco product use.  No history of asthma or COPD.  He is vaccinated against COVID.  No known sick contacts.    He denies any other questions or concerns at this time.    Current problem list, medication, and past medical/surgical history were reviewed in Epic.    ROS  See HPI     Objective:      /72 (BP Location: Left arm, Patient Position: Sitting, BP Cuff Size: Adult)   Pulse 78   Temp 36.4 °C (97.6 °F) (Temporal)   Ht 1.626 m (5' 4\")   Wt 69.3 kg (152 lb 12.8 oz)   SpO2 97%   BMI 26.23 kg/m²     Physical Exam  Constitutional:       General: He is not in acute distress.     Appearance: He is not diaphoretic.   HENT:      Right Ear: Tympanic membrane, ear canal and external ear normal.      Left Ear: Tympanic membrane, ear canal and external ear normal.      Nose:      Right Sinus: Frontal sinus tenderness present. No maxillary sinus tenderness.      Left Sinus: Frontal sinus tenderness present. No maxillary sinus tenderness.      Mouth/Throat:      Tongue: Tongue does not deviate from midline.      Palate: No lesions.      Pharynx: Uvula midline. No oropharyngeal exudate or posterior oropharyngeal erythema.      Tonsils: No tonsillar exudate. 1+ on the right. 1+ on the left.   Cardiovascular:      Rate and Rhythm: Normal rate and regular rhythm.      Heart sounds: Normal heart sounds.   Pulmonary:      Effort: Pulmonary effort is normal. No respiratory distress.      Breath sounds: Normal breath sounds.   Neurological:      Mental Status: He is alert.   Psychiatric:         Mood and Affect: Affect normal.         Judgment: Judgment normal.       Assessment/Plan:     1. Bacterial sinusitis  Criteria for bacterial sinusitis has been met.  Prescription for Augmentin has been sent.  Tylenol, ibuprofen, rest, and " hydration as needed for symptomatic relief.  - amoxicillin-clavulanate (AUGMENTIN) 875-125 MG Tab; Take 1 Tablet by mouth 2 times a day for 7 days.  Dispense: 14 Tablet; Refill: 0      Instructed to return to Urgent Care or nearest Emergency Department if symptoms fail to improve, for any change in condition, further concerns, or new concerning symptoms. Patient states understanding of the plan of care and discharge instructions.    Nena Ayala M.D.

## 2025-03-23 NOTE — LETTER
March 23, 2025    To Whom It May Concern:         This is confirmation that Dima Tedjuan Leung attended his scheduled appointment with Nena Ayala M.D. on 3/23/25. He may return to work Wednesday without any restrictions.          If you have any questions please do not hesitate to call me at the phone number listed below.    Sincerely,          Nena Ayala M.D.  [phone]